# Patient Record
Sex: MALE | Race: WHITE | NOT HISPANIC OR LATINO | URBAN - METROPOLITAN AREA
[De-identification: names, ages, dates, MRNs, and addresses within clinical notes are randomized per-mention and may not be internally consistent; named-entity substitution may affect disease eponyms.]

---

## 2022-12-13 ENCOUNTER — EMERGENCY (EMERGENCY)
Facility: HOSPITAL | Age: 32
LOS: 1 days | Discharge: HOME | End: 2022-12-13
Attending: STUDENT IN AN ORGANIZED HEALTH CARE EDUCATION/TRAINING PROGRAM | Admitting: STUDENT IN AN ORGANIZED HEALTH CARE EDUCATION/TRAINING PROGRAM
Payer: COMMERCIAL

## 2022-12-13 VITALS
HEART RATE: 60 BPM | DIASTOLIC BLOOD PRESSURE: 59 MMHG | RESPIRATION RATE: 18 BRPM | TEMPERATURE: 98 F | OXYGEN SATURATION: 97 % | SYSTOLIC BLOOD PRESSURE: 107 MMHG

## 2022-12-13 VITALS
HEART RATE: 71 BPM | HEIGHT: 73 IN | OXYGEN SATURATION: 98 % | DIASTOLIC BLOOD PRESSURE: 84 MMHG | RESPIRATION RATE: 16 BRPM | SYSTOLIC BLOOD PRESSURE: 136 MMHG | WEIGHT: 179.9 LBS | TEMPERATURE: 98 F

## 2022-12-13 LAB
ALBUMIN SERPL ELPH-MCNC: 4.8 G/DL — SIGNIFICANT CHANGE UP (ref 3.3–5)
ALP SERPL-CCNC: 137 U/L — HIGH (ref 40–120)
ALT FLD-CCNC: 42 U/L — SIGNIFICANT CHANGE UP (ref 10–45)
ANION GAP SERPL CALC-SCNC: 9 MMOL/L — SIGNIFICANT CHANGE UP (ref 5–17)
AST SERPL-CCNC: 22 U/L — SIGNIFICANT CHANGE UP (ref 10–40)
BASOPHILS # BLD AUTO: 0.07 K/UL — SIGNIFICANT CHANGE UP (ref 0–0.2)
BASOPHILS NFR BLD AUTO: 0.8 % — SIGNIFICANT CHANGE UP (ref 0–2)
BILIRUB SERPL-MCNC: 0.5 MG/DL — SIGNIFICANT CHANGE UP (ref 0.2–1.2)
BUN SERPL-MCNC: 12 MG/DL — SIGNIFICANT CHANGE UP (ref 7–23)
CALCIUM SERPL-MCNC: 10 MG/DL — SIGNIFICANT CHANGE UP (ref 8.4–10.5)
CHLORIDE SERPL-SCNC: 102 MMOL/L — SIGNIFICANT CHANGE UP (ref 96–108)
CO2 SERPL-SCNC: 32 MMOL/L — HIGH (ref 22–31)
CREAT SERPL-MCNC: 0.87 MG/DL — SIGNIFICANT CHANGE UP (ref 0.5–1.3)
EGFR: 116 ML/MIN/1.73M2 — SIGNIFICANT CHANGE UP
EOSINOPHIL # BLD AUTO: 0.13 K/UL — SIGNIFICANT CHANGE UP (ref 0–0.5)
EOSINOPHIL NFR BLD AUTO: 1.4 % — SIGNIFICANT CHANGE UP (ref 0–6)
GLUCOSE SERPL-MCNC: 103 MG/DL — HIGH (ref 70–99)
HCT VFR BLD CALC: 47.8 % — SIGNIFICANT CHANGE UP (ref 39–50)
HGB BLD-MCNC: 16.3 G/DL — SIGNIFICANT CHANGE UP (ref 13–17)
IMM GRANULOCYTES NFR BLD AUTO: 0.3 % — SIGNIFICANT CHANGE UP (ref 0–0.9)
LYMPHOCYTES # BLD AUTO: 3.42 K/UL — HIGH (ref 1–3.3)
LYMPHOCYTES # BLD AUTO: 37.7 % — SIGNIFICANT CHANGE UP (ref 13–44)
MCHC RBC-ENTMCNC: 29.5 PG — SIGNIFICANT CHANGE UP (ref 27–34)
MCHC RBC-ENTMCNC: 34.1 GM/DL — SIGNIFICANT CHANGE UP (ref 32–36)
MCV RBC AUTO: 86.4 FL — SIGNIFICANT CHANGE UP (ref 80–100)
MONOCYTES # BLD AUTO: 0.58 K/UL — SIGNIFICANT CHANGE UP (ref 0–0.9)
MONOCYTES NFR BLD AUTO: 6.4 % — SIGNIFICANT CHANGE UP (ref 2–14)
NEUTROPHILS # BLD AUTO: 4.83 K/UL — SIGNIFICANT CHANGE UP (ref 1.8–7.4)
NEUTROPHILS NFR BLD AUTO: 53.4 % — SIGNIFICANT CHANGE UP (ref 43–77)
NRBC # BLD: 0 /100 WBCS — SIGNIFICANT CHANGE UP (ref 0–0)
PLATELET # BLD AUTO: 242 K/UL — SIGNIFICANT CHANGE UP (ref 150–400)
POTASSIUM SERPL-MCNC: 4.4 MMOL/L — SIGNIFICANT CHANGE UP (ref 3.5–5.3)
POTASSIUM SERPL-SCNC: 4.4 MMOL/L — SIGNIFICANT CHANGE UP (ref 3.5–5.3)
PROT SERPL-MCNC: 7.8 G/DL — SIGNIFICANT CHANGE UP (ref 6–8.3)
RBC # BLD: 5.53 M/UL — SIGNIFICANT CHANGE UP (ref 4.2–5.8)
RBC # FLD: 12.9 % — SIGNIFICANT CHANGE UP (ref 10.3–14.5)
SODIUM SERPL-SCNC: 143 MMOL/L — SIGNIFICANT CHANGE UP (ref 135–145)
WBC # BLD: 9.06 K/UL — SIGNIFICANT CHANGE UP (ref 3.8–10.5)
WBC # FLD AUTO: 9.06 K/UL — SIGNIFICANT CHANGE UP (ref 3.8–10.5)

## 2022-12-13 PROCEDURE — 80053 COMPREHEN METABOLIC PANEL: CPT

## 2022-12-13 PROCEDURE — 85025 COMPLETE CBC W/AUTO DIFF WBC: CPT

## 2022-12-13 PROCEDURE — 99284 EMERGENCY DEPT VISIT MOD MDM: CPT

## 2022-12-13 PROCEDURE — 93005 ELECTROCARDIOGRAM TRACING: CPT

## 2022-12-13 PROCEDURE — 99283 EMERGENCY DEPT VISIT LOW MDM: CPT

## 2022-12-13 PROCEDURE — 93010 ELECTROCARDIOGRAM REPORT: CPT

## 2022-12-13 PROCEDURE — 36415 COLL VENOUS BLD VENIPUNCTURE: CPT

## 2022-12-13 NOTE — ED PROVIDER NOTE - ATTENDING APP SHARED VISIT CONTRIBUTION OF CARE
32m hx Crohns dz presenting with intermittent palpitations. no sob or chest pain. not related to exertion. had simlar symptmos several year ago and saw a cardiologist with normal workup. went to UC today, reffered to ED for laboratory eval to rule out electrolyte abnormaliteis as aptient has had 2 episodes of diarrhea/day for several days. no abdo pain. no blood in diarrhea. no n/v. exam as above epr PA. ekg nsr. screening labs unremarkable. stable for dc with cardiology follow up

## 2022-12-13 NOTE — ED PROVIDER NOTE - OBJECTIVE STATEMENT
31 y/o m hx Crohn's on humira presents c/o intermittent palpitations over the past few days.  Pt stating he feels like his heart is "skipping" periodically, sometimes having several episodes in a span of a few minutes and sometimes he doesn't notice anything for a prolonged period of time.  Pt does report increased loose stool recently, no abd pain or fever.  Pt went to urgent care today and was referred to ED.  Pt has no sx currently.  Denies CP, SOB, all other ROS negative.

## 2022-12-13 NOTE — ED ADULT NURSE NOTE - OBJECTIVE STATEMENT
pt complaining of palpitations sent from [facility] pt denies cp sob respirations unlabored speaks clear full sentences thinks symptoms could be from anxiety. iv placed labs sent 12 lead ekg done nsr noted

## 2022-12-13 NOTE — ED PROVIDER NOTE - CLINICAL SUMMARY MEDICAL DECISION MAKING FREE TEXT BOX
31 y/o m hx Crohn's on humira presents with intermittent palpitations over the past few days.  Pt asymptomatic in ED, EKG sinus at 73, labs unremarkable.  No further w/u indicated in ED, will recommend outpatient cards follow up, possibly needing holter monitor, advised to return to ED if sx worsen.

## 2022-12-13 NOTE — ED PROVIDER NOTE - NS ED ATTENDING STATEMENT MOD
This was a shared visit with the KATHY. I reviewed and verified the documentation and independently performed the documented:

## 2022-12-13 NOTE — ED ADULT TRIAGE NOTE - CHIEF COMPLAINT QUOTE
Pt walked into ED c/o intermittent palpitations and chest "twitch" x3-4 days. Denies SOB, fevers, coughing. Endorses diarrhea (hx of crohns). "I went to [facility] MD and they said it could be nothing but just to come over here to have my electrolytes and stuff checked."

## 2022-12-13 NOTE — ED ADULT NURSE NOTE - CHIEF COMPLAINT QUOTE
Pt walked into ED c/o intermittent palpitations and chest "twitch" x3-4 days. Denies SOB, fevers, coughing. Endorses diarrhea (hx of crohns). "I went to [facility] MD and they said it could be nothing but just to come over here to have my electrolytes and stuff checked."

## 2022-12-13 NOTE — ED PROVIDER NOTE - PATIENT PORTAL LINK FT
You can access the FollowMyHealth Patient Portal offered by [facility] by registering at the following website: [email or web address]. By joining [employer]’s FollowMyHealth portal, you will also be able to view your health information using other applications (apps) compatible with our system.

## 2022-12-15 DIAGNOSIS — R00.2 PALPITATIONS: ICD-10-CM

## 2022-12-15 DIAGNOSIS — K50.90 CROHN'S DISEASE, UNSPECIFIED, WITHOUT COMPLICATIONS: ICD-10-CM

## 2022-12-15 DIAGNOSIS — R19.7 DIARRHEA, UNSPECIFIED: ICD-10-CM
